# Patient Record
(demographics unavailable — no encounter records)

---

## 2024-10-14 NOTE — HISTORY OF PRESENT ILLNESS
[de-identified] : Follow-up Right and left total knee [de-identified] : Denies fever chills or neurovascular symptoms left leg Able to go up and down stairs.Reports left knee is pain-free walking independently with the left knee manipulation denies right knee symptoms overall reports marked decrease in preoperative knee symptoms [de-identified] : Well-nourished no distress Left knee neurovascular intact incision healed Neurovascularly intact left leg range of motion 0/95 ligaments intact Right knee incision healed no effusion flexion 0/90.  2-3 valgus laxity mid range [de-identified] :  impression left total knee replacement Status post manipulation Right total knee with ligamentous insufficiency not clinically manifest  [de-identified] : Follow-up 3 months